# Patient Record
Sex: MALE | Employment: UNEMPLOYED | URBAN - METROPOLITAN AREA
[De-identification: names, ages, dates, MRNs, and addresses within clinical notes are randomized per-mention and may not be internally consistent; named-entity substitution may affect disease eponyms.]

---

## 2020-09-11 ENCOUNTER — NURSE TRIAGE (OUTPATIENT)
Dept: NURSING | Facility: CLINIC | Age: 60
End: 2020-09-11

## 2020-09-11 ENCOUNTER — VIRTUAL VISIT (OUTPATIENT)
Dept: URGENT CARE | Facility: CLINIC | Age: 60
End: 2020-09-11
Payer: COMMERCIAL

## 2020-09-11 DIAGNOSIS — Z59.00 HOMELESS: ICD-10-CM

## 2020-09-11 DIAGNOSIS — R05.9 COUGH: Primary | ICD-10-CM

## 2020-09-11 PROCEDURE — 99203 OFFICE O/P NEW LOW 30 MIN: CPT | Mod: TEL | Performed by: INTERNAL MEDICINE

## 2020-09-11 SDOH — ECONOMIC STABILITY - HOUSING INSECURITY: HOMELESSNESS UNSPECIFIED: Z59.00

## 2020-09-11 NOTE — PROGRESS NOTES
"  Bret Galvez is a 59 year old male who is being evaluated via a billable telephone visit.      The patient has been notified of following:     \"This telephone visit will be conducted via a call between you and your physician/provider. We have found that certain health care needs can be provided without the need for a physical exam.  This service lets us provide the care you need with a short phone conversation.  If a prescription is necessary we can send it directly to your pharmacy.  If lab work is needed we can place an order for that and you can then stop by our lab to have the test done at a later time.    If during the course of the call the physician/provider feels a telephone visit is not appropriate, you will not be charged for this service.\"     Patient has given verbal consent for Telephone visit?  Yes    SUBJECTIVE:  Bret Galvez is an 59 year old male who presents for concern for covid.  Spent three days in [facility] recently and is also homeless.  Has generally been able to isolate.  Temp is 97.6 but face feels warm. Has some cough, which he thinks may be from smoking.  No n/v.  Had some loose stools two days ago.  No nasal congestion.  No meds taken for his sxs.     PMH:  Pt reports some \"mental health issues\" but does not specify dx or meds.    Social History     Socioeconomic History     Marital status: Single     Spouse name: Not on file     Number of children: Not on file     Years of education: Not on file     Highest education level: Not on file   Occupational History     Not on file   Social Needs     Financial resource strain: Not on file     Food insecurity     Worry: Not on file     Inability: Not on file     Transportation needs     Medical: Not on file     Non-medical: Not on file   Tobacco Use     Smoking status: Not on file   Substance and Sexual Activity     Alcohol use: Not on file     Drug use: Not on file     Sexual activity: Not on file   Lifestyle     Physical activity     " Days per week: Not on file     Minutes per session: Not on file     Stress: Not on file   Relationships     Social connections     Talks on phone: Not on file     Gets together: Not on file     Attends Sabianism service: Not on file     Active member of club or organization: Not on file     Attends meetings of clubs or organizations: Not on file     Relationship status: Not on file     Intimate partner violence     Fear of current or ex partner: Not on file     Emotionally abused: Not on file     Physically abused: Not on file     Forced sexual activity: Not on file   Other Topics Concern     Not on file   Social History Narrative     Not on file     No family history on file.    ALLERGIES:  Patient has no allergy information on record.    No current outpatient medications on file.     No current facility-administered medications for this visit.          ROS:  ROS is done and is negative for general/constitutional, eye, ENT, Respiratory, cardiovascular, GI, , Skin, musculoskeletal except as noted elsewhere.  All other review of systems negative except as noted elsewhere.      OBJECTIVE:  There were no vitals taken for this visit.  GENERAL : Alert and oriented.  Did not seem to be in distress.   RESP: No respiratory distress detected during phone conversation.  Occasional coarse cough.        ASSESSMENT/PLAN:    ASSESSMENT / PLAN:  (R05) Cough  (primary encounter diagnosis)  Comment: consider possibility of covid and as can become serious illness, will test for covid.  Also is at increased risk due to homelessness and recent time in senior living.  Plan: Symptomatic COVID-19 Virus (Coronavirus) by PCR        Reviewed testing scheduling and isolating from others. I reviewed supportive care, otc meds to use if needed, expected course, and signs of concern.  Follow up as needed or if he does not improve within 5 day(s) or if worsens in any way.  Reviewed red flag symptoms and is to go to the ER if experiences any of  these.    (Z59.0) Homeless  Comment: his h/o homelessness and recent time in alf increase his risk of covid and other diseases  Plan: pt to continue to seek support for his situation and to isolate from others to decrease risk of covid.          See [facility] for orders, medications, letters, patient instructions    Blossom Coburn MD  9/11/2020, 2:14 PM      Phone call duration:  8 minutes

## 2020-09-11 NOTE — TELEPHONE ENCOUNTER
Caller called about possible exposure to someone when he was in [facility] for 10 [facility].  Sates he is homeless and lives in his van, denied any symptoms of COVID-19 infection at this time.  Deena Simms RN  COVID 19 Nurse Triage Plan/Patient Instructions    Please be aware that novel coronavirus (COVID-19) may be circulating in the community. If you develop symptoms such as fever, cough, or SOB or if you have concerns about the presence of another infection including coronavirus (COVID-19), please contact your health care provider or visit [email or web address].     Disposition/Instructions    Virtual Visit with provider recommended. Reference Visit Selection Guide.    Thank you for taking steps to prevent the spread of this virus.  o Limit your contact with others.  o Wear a simple mask to cover your cough.  o Wash your hands well and often.    Resources    [facility]: About COVID-19: [email or web address]    CDC: What to Do If You're Sick: www.cdc.gov/coronavirus/2019-ncov/about/steps-when-sick.html    CDC: Ending Home Isolation: www.cdc.gov/coronavirus/2019-ncov/hcp/disposition-in-home-patients.html     CDC: Caring for Someone: www.cdc.gov/coronavirus/2019-ncov/if-you-are-sick/care-for-someone.html     [facility]: Interim Guidance for Hospital Discharge to Home: www.health.[facility].[place].us/diseases/coronavirus/hcp/hospdischarge.pdf    [facility] clinical trials (COVID-19 research studies): [email or web address]     Below are the COVID-19 hotlines at the [facility] ([facility]). Interpreters are available.   o For health questions: Call 895-083-1980 or 1-239.468.1338 (7 a.m. to 7 p.m.)  o For questions about schools and childcare: Call 795-033-4026 or 1-833.457.1545 (7 a.m. to 7 p.m.)                     Reason for Disposition    [1] COVID-19 EXPOSURE (Close Contact) AND [2] within last 14 days BUT [3] NO symptoms     Possible while in long-term    Additional Information     "Negative: COVID-19 has been diagnosed by a healthcare provider (HCP)    Negative: COVID-19 lab test positive    Negative: [1] Symptoms of COVID-19 (e.g., cough, fever, SOB, or others) AND [2] lives in an area with community spread    Negative: [1] Symptoms of COVID-19 (e.g., cough, fever, SOB, or others) AND [2] within 14 days of EXPOSURE (close contact) with diagnosed or suspected COVID-19 patient    Negative: [1] Symptoms of COVID-19 (e.g., cough, fever, SOB, or others) AND [2] within 14 days of travel from high-risk area for COVID-19 community spread (identified by CDC)    Negative: [1] Difficulty breathing (shortness of breath) occurs AND [2] onset > 14 days after COVID-19 EXPOSURE (Close Contact) AND [3] no community spread where patient lives    Negative: [1] Dry cough occurs AND [2] onset > 14 days after COVID-19 EXPOSURE AND [3] no community spread where patient lives    Negative: [1] Wet cough (i.e., white-yellow, yellow, green, or thong colored sputum) AND [2] onset > 14 days after COVID-19 EXPOSURE AND [3] no community spread where patient lives    Negative: [1] Common cold symptoms AND [2] onset > 14 days after COVID-19 EXPOSURE AND [3] no community spread where patient lives    Negative: [1] COVID-19 EXPOSURE (Close Contact) within last 14 days AND [2] needs COVID-19 lab test to return to work AND [3] NO symptoms    Negative: [1] COVID-19 EXPOSURE (Close Contact) within last 14 days AND [2] exposed person is a healthcare worker who was NOT using all recommended personal protective equipment (i.e., a respirator-N95 mask, eye protection, gloves, and gown) AND [3] NO symptoms    Answer Assessment - Initial Assessment Questions  1. CLOSE CONTACT: \"Who is the person with the confirmed or suspected COVID-19 infection that you were exposed to?\"      Not sure  2. PLACE of CONTACT: \"Where were you when you were exposed to COVID-19?\" (e.g., home, school, medical waiting room; which city?)      nursing home  3. TYPE of " "CONTACT: \"How much contact was there?\" (e.g., sitting next to, live in same house, work in same office, same building)     care home  4. DURATION of CONTACT: \"How long were you in contact with the COVID-19 patient?\" (e.g., a few seconds, passed by person, a few minutes, live with the patient)      Hours-in care home  5. DATE of CONTACT: \"When did you have contact with a COVID-19 patient?\" (e.g., how many days ago)      10 days ago in care home  6. TRAVEL: \"Have you traveled out of the country recently?\" If so, \"When and where?\"      * Also ask about out-of-state travel, since the [facility] has identified some high-risk cities for community spread in the .      * Note: Travel becomes less relevant if there is widespread community transmission where the patient lives.      No  7. COMMUNITY SPREAD: \"Are there lots of cases of COVID-19 (community spread) where you live?\" (See public health department website, if unsure)        Yes  8. SYMPTOMS: \"Do you have any symptoms?\" (e.g., fever, cough, breathing difficulty)      None  9. PREGNANCY OR POSTPARTUM: \"Is there any chance you are pregnant?\" \"When was your last menstrual period?\" \"Did you deliver in the last 2 weeks?\"      No  10. HIGH RISK: \"Do you have any heart or lung problems? Do you have a weak immune system?\" (e.g., CHF, COPD, asthma, HIV positive, chemotherapy, renal failure, diabetes mellitus, sickle cell anemia)        No    Protocols used: CORONAVIRUS (COVID-19) EXPOSURE-A- 8.4.20      "

## 2020-09-12 ENCOUNTER — AMBULATORY - HEALTHEAST (OUTPATIENT)
Dept: INTERNAL MEDICINE | Facility: CLINIC | Age: 60
End: 2020-09-12

## 2020-09-12 ENCOUNTER — AMBULATORY - HEALTHEAST (OUTPATIENT)
Dept: FAMILY MEDICINE | Facility: CLINIC | Age: 60
End: 2020-09-12

## 2020-09-12 DIAGNOSIS — R05.9 COUGH: ICD-10-CM

## 2020-09-14 ENCOUNTER — COMMUNICATION - HEALTHEAST (OUTPATIENT)
Dept: SCHEDULING | Facility: CLINIC | Age: 60
End: 2020-09-14

## 2021-05-30 ENCOUNTER — RECORDS - HEALTHEAST (OUTPATIENT)
Dept: ADMINISTRATIVE | Facility: CLINIC | Age: 61
End: 2021-05-30